# Patient Record
Sex: MALE | Race: WHITE | NOT HISPANIC OR LATINO | Employment: UNEMPLOYED | URBAN - METROPOLITAN AREA
[De-identification: names, ages, dates, MRNs, and addresses within clinical notes are randomized per-mention and may not be internally consistent; named-entity substitution may affect disease eponyms.]

---

## 2021-05-30 ENCOUNTER — RECORDS - HEALTHEAST (OUTPATIENT)
Dept: ADMINISTRATIVE | Facility: CLINIC | Age: 31
End: 2021-05-30

## 2021-06-26 ENCOUNTER — HEALTH MAINTENANCE LETTER (OUTPATIENT)
Age: 31
End: 2021-06-26

## 2021-10-16 ENCOUNTER — HEALTH MAINTENANCE LETTER (OUTPATIENT)
Age: 31
End: 2021-10-16

## 2022-07-23 ENCOUNTER — HEALTH MAINTENANCE LETTER (OUTPATIENT)
Age: 32
End: 2022-07-23

## 2022-10-01 ENCOUNTER — HEALTH MAINTENANCE LETTER (OUTPATIENT)
Age: 32
End: 2022-10-01

## 2023-01-09 ENCOUNTER — HOSPITAL ENCOUNTER (EMERGENCY)
Facility: HOSPITAL | Age: 33
Discharge: HOME | End: 2023-01-09
Admitting: PHYSICIAN ASSISTANT
Payer: COMMERCIAL

## 2023-01-09 VITALS
OXYGEN SATURATION: 97 % | DIASTOLIC BLOOD PRESSURE: 84 MMHG | RESPIRATION RATE: 16 BRPM | BODY MASS INDEX: 26.66 KG/M2 | TEMPERATURE: 98.9 F | HEART RATE: 78 BPM | SYSTOLIC BLOOD PRESSURE: 144 MMHG | HEIGHT: 69 IN | WEIGHT: 180 LBS

## 2023-01-09 DIAGNOSIS — K04.7 DENTAL ABSCESS: ICD-10-CM

## 2023-01-09 DIAGNOSIS — K08.89 PAIN, DENTAL: ICD-10-CM

## 2023-01-09 PROCEDURE — 250N000011 HC RX IP 250 OP 636: Performed by: EMERGENCY MEDICINE

## 2023-01-09 PROCEDURE — 99284 EMERGENCY DEPT VISIT MOD MDM: CPT

## 2023-01-09 PROCEDURE — 96372 THER/PROPH/DIAG INJ SC/IM: CPT | Mod: 59 | Performed by: EMERGENCY MEDICINE

## 2023-01-09 PROCEDURE — 250N000013 HC RX MED GY IP 250 OP 250 PS 637: Performed by: EMERGENCY MEDICINE

## 2023-01-09 RX ORDER — KETOROLAC TROMETHAMINE 30 MG/ML
30 INJECTION, SOLUTION INTRAMUSCULAR; INTRAVENOUS ONCE
Status: COMPLETED | OUTPATIENT
Start: 2023-01-09 | End: 2023-01-09

## 2023-01-09 RX ORDER — OXYCODONE HYDROCHLORIDE 5 MG/1
5 TABLET ORAL ONCE
Status: COMPLETED | OUTPATIENT
Start: 2023-01-09 | End: 2023-01-09

## 2023-01-09 RX ADMIN — KETOROLAC TROMETHAMINE 30 MG: 30 INJECTION, SOLUTION INTRAMUSCULAR; INTRAVENOUS at 15:31

## 2023-01-09 ASSESSMENT — ENCOUNTER SYMPTOMS
HEMATURIA: 0
DIARRHEA: 0
WEAKNESS: 0
HEADACHES: 0
CHILLS: 0
VOMITING: 0
LIGHT-HEADEDNESS: 0
DIZZINESS: 0
NAUSEA: 0
FATIGUE: 0
TROUBLE SWALLOWING: 0
SHORTNESS OF BREATH: 0
SORE THROAT: 0
RECTAL PAIN: 0
FEVER: 0
ABDOMINAL PAIN: 0
CHEST TIGHTNESS: 0
VOICE CHANGE: 0
DYSURIA: 0

## 2023-01-09 ASSESSMENT — ACTIVITIES OF DAILY LIVING (ADL): ADLS_ACUITY_SCORE: 33

## 2023-01-09 NOTE — ED PROVIDER NOTES
EMERGENCY DEPARTMENT ENCOUNTER      NAME: London Sheikh  AGE: 32 year old male  YOB: 1990  MRN: [id number]  EVALUATION DATE & TIME: No admission date for patient encounter.    PCP: No Ref-Primary, Physician    ED PROVIDER: Jon Ohara PA-C      Chief Complaint   Patient presents with     Dental Pain     Facial Swelling         FINAL IMPRESSION:  1. Pain, dental    2. Dental abscess          ED COURSE & MEDICAL DECISION MAKING:    Pertinent Labs & Imaging studies reviewed. (See chart for details)  2:48 PM I met the patient and performed my initial interview and exam. Plan for discharge. Patient stating he does not want to stay for pain medications.     32 year old male presents to the Emergency Department for evaluation of dental pain, left sided.     ED Course as of 01/09/23 1612   Mon Jan 09, 2023   1610 Is a 32-year-old male, presents emergency department for evaluation of left upper sided dental pain, facial swelling.  Patient notes that he had a broken tooth on the left upper side for a couple months.  Has been taking ibuprofen and Tylenol at home.  Last dose of ibuprofen and Tylenol was at noon.  Denies any fevers.  Notes that he previously an appointment with a dentist, however he did not follow-up with them.  No nausea or vomiting.  No dysuria, hematuria, numbness or tingling.  On examination he does have left upper sided dental tenderness, poor dentition throughout his mouth, however area of focal tenderness, fluctuance, with a spontaneously draining dental abscess.  He has no evidence of any deep space abscess, no tenderness throughout the rest of his mouth.  No trismus.  No evidence of any Bryon angina, no concern for any deeper space abscess.  Patient is afebrile, nontachycardic, not acutely toxic appearing here.  Swelling is fairly minimal, and the fact that the abscess is spontaneously draining, no need for any incision and drainage here in the emergency department.  We  will start him on some Augmentin, he will receive a dose of Toradol here in the emergency department, and recommend he follows up with dentistry.  Patient is agreeable with this plan.  Plan for discharge following locations.  No occasion for further labs, imaging at this time.     Medical Decision Making    History:    Supplemental history from: Documented in HPI, if applicable    External Record(s) reviewed: Documented in HPI, if applicable.    Work Up:    Chart documentation includes differential considered and any EKGs or imaging independently interpreted by provider.    In additional to work up documented, I considered the following work up: See chart documentation, if applicable.    External consultation:    Discussion of management with another provider: See chart documentation, if applicable    Complicating factors:    Care impacted by chronic illness: N/A    Care affected by social determinants of health: Access to Medical Care    Disposition considerations: Discharge. I prescribed additional prescription strength medication(s) as charted. N/A.     At the conclusion of the encounter I discussed the results of all of the tests and the disposition. The questions were answered. The patient or family acknowledged understanding and was agreeable with the care plan.     0 minutes of critical care time     MEDICATIONS GIVEN IN THE EMERGENCY:  Medications   ketorolac (TORADOL) injection 30 mg (30 mg Intramuscular Given 1/9/23 1531)   oxyCODONE (ROXICODONE) tablet 5 mg (5 mg Oral Not Given 1/9/23 1530)       NEW PRESCRIPTIONS STARTED AT TODAY'S ER VISIT  New Prescriptions    AMOXICILLIN-CLAVULANATE (AUGMENTIN) 875-125 MG TABLET    Take 1 tablet by mouth 2 times daily for 10 days          =================================================================    [facility]    Patient information was obtained from: Patient     Use of : N/A      London Sheikh is a 32 year old male with a pertinent history of  adjustment disorder, meth use, cannabis use disorder, depression with anxiety, who presents to this ED  for evaluation of left-sided dental pain.  Patient notes is been ongoing for couple of weeks.  Declines any fevers.  Notes that he had an appointment to follow-up with his dentist, however did not.  He has been taking ibuprofen and Tylenol at home.  He is concerned about infection.  Denies any nausea or vomiting, denies any dizziness, lightheadedness, numbness or tingling.  Unclear whether he has had any fevers at home.  Is able to open and close his mouth without difficulty, no difficulty eating or drinking, no other acute complaints other than left-sided dental pain.       REVIEW OF SYSTEMS   Review of Systems   Constitutional: Negative for chills, fatigue and fever.   HENT: Positive for dental problem. Negative for ear pain, sneezing, sore throat, trouble swallowing and voice change.    Respiratory: Negative for chest tightness and shortness of breath.    Cardiovascular: Negative for chest pain.   Gastrointestinal: Negative for abdominal pain, diarrhea, nausea, rectal pain and vomiting.   Genitourinary: Negative for dysuria and hematuria.   Neurological: Negative for dizziness, weakness, light-headedness and headaches.   All other systems reviewed and are negative.       PAST MEDICAL HISTORY:  No past medical history on file.    PAST SURGICAL HISTORY:  Past Surgical History:   Procedure Laterality Date     HERNIA REPAIR Bilateral            CURRENT MEDICATIONS:    amoxicillin-clavulanate (AUGMENTIN) 875-125 MG tablet  albuterol (PROVENTIL HFA: VENTOLIN HFA) 108 (90 BASE) MCG/ACT inhaler  amoxicillin-clavulanate (AUGMENTIN) 875-125 MG per tablet  oseltamivir (TAMIFLU) 75 MG capsule         ALLERGIES:  Allergies   Allergen Reactions     Acetaminophen      Can't take. Hx of intentional OD       FAMILY HISTORY:  No family history on file.    SOCIAL HISTORY:   Social History     Socioeconomic History     Marital  "status: Single   Tobacco Use     Smoking status: Every Day     Packs/day: 1.00     Types: Cigarettes     Smokeless tobacco: Never   Substance and Sexual Activity     Alcohol use: No     Drug use: Yes     Types: IV, Methamphetamines       VITALS:  BP (!) 144/84   Pulse 78   Temp 98.9  F (37.2  C)   Resp 16   Ht 1.753 m (5' 9\")   Wt 81.6 kg (180 lb)   SpO2 97%   BMI 26.58 kg/m      PHYSICAL EXAM    Physical Exam  Vitals and nursing note reviewed.   Constitutional:       General: He is not in acute distress.     Appearance: Normal appearance. He is normal weight. He is not toxic-appearing or diaphoretic.   HENT:      Right Ear: External ear normal.      Left Ear: External ear normal.      Mouth/Throat:      Mouth: Mucous membranes are moist.      Dentition: Abnormal dentition. Dental tenderness, dental caries and dental abscesses present.      Pharynx: No oropharyngeal exudate or posterior oropharyngeal erythema.      Tonsils: No tonsillar exudate or tonsillar abscesses.      Comments: Patient is left upper sided dental tenderness, with a spontaneously draining over sided dental abscess.  No sublingual tenderness, no evidence of Bryon angina, no other acute tenderness throughout the mouth, poor dentition throughout.  No posterior oropharyngeal swelling, exudate, tonsillar abscess.  Uvula is midline.  No swelling. No trismus.  Eyes:      Conjunctiva/sclera: Conjunctivae normal.   Cardiovascular:      Rate and Rhythm: Normal rate and regular rhythm.   Pulmonary:      Effort: Pulmonary effort is normal.   Abdominal:      General: Abdomen is flat. There is no distension.      Palpations: Abdomen is soft.      Tenderness: There is no abdominal tenderness. There is no guarding or rebound.   Skin:     Findings: Erythema present.   Neurological:      Mental Status: He is alert. Mental status is at baseline.         LAB:  All pertinent labs reviewed and interpreted.  Labs Ordered and Resulted from Time of ED Arrival to " Time of ED Departure - No data to display    RADIOLOGY:  Reviewed all pertinent imaging. Please see official radiology report.  No orders to display     PROCEDURES:   None.     Jon Ohara PA-C  Emergency Medicine  [facility] EMERGENCY DEPARTMENT  [address] [place] [id number]  [phone]  Dept: [phone]     Jon Ohara PA-C  01/09/23 6742

## 2023-01-09 NOTE — DISCHARGE INSTRUCTIONS
You were seen here in the emergency department for evaluation of dental pain, your exam here is consistent with- dental abscess- we will put you on antibiotics, please follow up with your dentist.         [place] [facility]   [facility]  [address], [facility]  [phone] Accepts [place]    [facility]  [address] , [facility]  [phone] No Fee Hours and services vary each month - call them before going.  Sometimes only offer extractions.   [facility]  [address] ,[facility]  [phone] Sliding fee: ER visit - $50 up front  regular - $35 up front Call or arrive at 7:45 AM on Mondays, Tues, or Thursdays to sign up for same-day appointments for dentalpain / emergencies.        [facility] Sliding  fee  Call for details Walk-ins and same-day appointmentsin's accepted 8-11AM and 1-4PM  Monday-Friday   [address]     [phone]          [facility] dental Sliding fee If no insurance, call first.    [address], [place]     [phone]          [facility]  [address], [facility]  [phone] Sliding fee Call 8:00 AM Mon-Thurs for next-day  appointments (for emergencies/pain only) Help with [place] is available.        [facility]  [address], [facility]  [phone] Call for fees Only for adult dental emergencies.  Costs about 30% less than private practice clinics  To sign up for the regular dental clinic, call [phone].        [facility])  [address]  [phone] Sliding fee scale For people without dentalinsurance only.   Cleaning, xray, exams, fillings, sealants only - no extractions or rootcanals, etc.  No walk-ins.        [address],[facility]  [phone] No Fee No walk-ins, not accepting people with insurance. Extractions for uninsured people only. Call Friday 2PM to get on  next week's list        [facility]   [address]  [phone] Sliding fee  $40 up front No walk-ins. Call at 8AM Mon-Fri forsame- day visits.  Can schedule Saturday emergency visits by calling Friday morning.   [facility]  [address]  [phone] Sliding fee  Call for details No walk-ins.  For emergency appointments:  Call on Thursday 3PM (for Friday appt) OR Call on Friday 3PM (for Monday appt)        [facility]  [address] [place] [address]  [phone] Sliding fee -   Call for details Call on Tuesdays at 3PM to get anurgent Weds. appointment.  Call anytime during business hours to schedule other appointments.             OTHER RESOURCES       [facility],   [address]  [address] [place]  [address]  [phone]    [facility]  [address] [place] [address]  [phone]      Referral Service, 800-DENTIST        Open 9a to 9p 7 days a week          7 days a week 7a to 5p.                [employer] [facility], [phone] For people who are elderly,disabled, or medically compromised and have no other way to pay for dental care. Call to get an application. If approved, services are provided through volunteer dentists.

## 2023-01-09 NOTE — ED TRIAGE NOTES
Pt comes in with pain and swelling to L side of face. Pt has had broken upper tooth on L side for a couple months. Been taking tylenol and ibuprofen at home. Last dose tylenol and ibuprofen at noon. Ice and heat at home attempted.

## 2023-08-06 ENCOUNTER — HEALTH MAINTENANCE LETTER (OUTPATIENT)
Age: 33
End: 2023-08-06

## 2024-05-27 ENCOUNTER — HOSPITAL ENCOUNTER (EMERGENCY)
Facility: HOSPITAL | Age: 34
Discharge: HOME | End: 2024-05-27
Attending: EMERGENCY MEDICINE | Admitting: EMERGENCY MEDICINE
Payer: COMMERCIAL

## 2024-05-27 VITALS
HEIGHT: 69 IN | SYSTOLIC BLOOD PRESSURE: 145 MMHG | HEART RATE: 76 BPM | RESPIRATION RATE: 18 BRPM | WEIGHT: 170 LBS | BODY MASS INDEX: 25.18 KG/M2 | TEMPERATURE: 98 F | OXYGEN SATURATION: 98 % | DIASTOLIC BLOOD PRESSURE: 83 MMHG

## 2024-05-27 DIAGNOSIS — S61.512A LACERATION OF LEFT WRIST, INITIAL ENCOUNTER: ICD-10-CM

## 2024-05-27 PROCEDURE — 99283 EMERGENCY DEPT VISIT LOW MDM: CPT

## 2024-05-27 PROCEDURE — 250N000009 HC RX 250: Performed by: EMERGENCY MEDICINE

## 2024-05-27 PROCEDURE — 12002 RPR S/N/AX/GEN/TRNK2.6-7.5CM: CPT

## 2024-05-27 RX ORDER — GINSENG 100 MG
CAPSULE ORAL ONCE
Status: COMPLETED | OUTPATIENT
Start: 2024-05-28 | End: 2024-05-27

## 2024-05-27 RX ADMIN — BACITRACIN: 500 OINTMENT TOPICAL at 23:49

## 2024-05-27 ASSESSMENT — COLUMBIA-SUICIDE SEVERITY RATING SCALE - C-SSRS
1. IN THE PAST MONTH, HAVE YOU WISHED YOU WERE DEAD OR WISHED YOU COULD GO TO SLEEP AND NOT WAKE UP?: NO
2. HAVE YOU ACTUALLY HAD ANY THOUGHTS OF KILLING YOURSELF IN THE PAST MONTH?: NO
6. HAVE YOU EVER DONE ANYTHING, STARTED TO DO ANYTHING, OR PREPARED TO DO ANYTHING TO END YOUR LIFE?: NO

## 2024-05-28 NOTE — ED TRIAGE NOTES
Pt was chopping wood just before coming in- the hatchet fell out of hand because it was sweaty and hit left wrist. Just was sharpened and caused laceration. Bleeding controlled. CMS intact. Last tdap 2020    Pt denies any self injury behaviors.

## 2024-05-28 NOTE — ED PROVIDER NOTES
EMERGENCY DEPARTMENT ENCOUNTER      NAME: London Sheikh  AGE: 33 year old male  YOB: 1990  MRN: [id number]  EVALUATION DATE & TIME: 5/27/2024 11:12 PM    PCP: No Ref-Primary, Physician    ED PROVIDER: Andrea Arriaga M.D.      Chief Complaint   Patient presents with    Laceration         IMPRESSION  1. Laceration of left wrist, initial encounter        PLAN  - routine non-absorbable sutured wound cares  - suture removal in 7 days in PCP clinic (5 total sutures)  - discharge to home    ED COURSE & MEDICAL DECISION MAKING    ED Course as of 05/28/24 0045   Mon May 27, 2024   2346 33yoM right handed with tetanus up to date presenting with a friend from home for evaluation of left wrist laceration. Was using a hatchet to cut wood when it slipped out of his right hand and cut his left wrist. Has 3cm laceration to flexor left wrist, radial aspect but just lateral to palpable radial artery with no active bleeding, no hematoma, no tenderness or pain with ROM of joints, distal CMS intact. Doubt neurovascular injury, tendon injury, bony injury; patient comfortable not obtaining imaging here now which I agree with. Laceration cleansed & closed by me at bedside with 5 4-0 Ethilon sutures; patient tolerated this well. Dressed with bacitracin & clean bandage. Patient comfortable with discharge at this time. Return precautions and need for PCP follow up discussed and understood. No further questions at the time of discharge.       --------------------------------------------------------------------------------   --------------------------------------------------------------------------------     11:24 PM I met with the patient for the initial interview and physical examination. Discussed plan for treatment and workup in the ED.  11:35 PM Performed laceration repair. We discussed the plan for discharge and the patient is agreeable. Reviewed supportive cares, symptomatic treatment, outpatient follow up, and  reasons to return to the Emergency Department. Patient to be discharged by ED RN.     This patient involved a high degree of complexity in medical decision making, as significant risks were present and assessed. Recent encounters & results in medical record reviewed by me.    All workup (i.e. any EKG/labs/imaging as per charting below) reviewed and independently interpreted by me. See respective sections for details.    Broad differential considered for this patient, including but not limited to:  superficial laceration, neurovascular injury, tendon injury, bony injury, foreign body      See additional MDM below if interested.      MEDICATIONS GIVEN IN THE EMERGENCY DEPARTMENT  Medications   bacitracin ointment ( Topical $Given 5/27/24 3435)       NEW PRESCRIPTIONS STARTED AT TODAY'S ER VISIT  Discharge Medication List as of 5/27/2024 11:49 PM        CONTINUE these medications which have NOT CHANGED    Details   albuterol (PROVENTIL HFA: VENTOLIN HFA) 108 (90 BASE) MCG/ACT inhaler Inhale 2 puffs into the lungs every 4 hours as needed for shortness of breath / dyspnea., 2 puff, Inhalation, EVERY 4 HOURS PRN Starting 12/21/2012, Until Discontinued, Disp-1 Inhaler, R-0, E-PrescribeUse with an inhaler.      amoxicillin-clavulanate (AUGMENTIN) 875-125 MG per tablet Take 1 tablet by mouth 2 times daily., 1 tablet, Oral, 2 TIMES DAILY, Until Discontinued, Historical      oseltamivir (TAMIFLU) 75 MG capsule Take 1 capsule by mouth 2 times daily., 75 mg, Oral, 2 TIMES DAILY Starting 12/21/2012, Until Discontinued, Disp-10 capsule, R-0, E-Prescribe                 =================================================================      HPI  Use of : N/A         London MARCELO Sheikh is a 33 year old male with a pertinent history of hypertension and hyperlipidemia who presents to this ED by walk in for evaluation of laceration.    Prior to arrival, the patient reports he was using an axe to chop wood. He put down the axe  and picked up the hatchet with his right hand, noting his hands were sweaty and he was not wearing gloves. Accidentally dropped the hatchet onto his left wrist, which was holding a piece of wood. Sustained a large laceration to left wrist.No other complaints at this time.       --------------- MEDICAL HISTORY ---------------  PAST MEDICAL HISTORY:  Reviewed independently by me.  History reviewed. No pertinent past medical history.  Patient Active Problem List   Diagnosis    CARDIOVASCULAR SCREENING; LDL GOAL LESS THAN 160       PAST SURGICAL HISTORY:  Reviewed independently by me.  Past Surgical History:   Procedure Laterality Date    HERNIA REPAIR Bilateral        CURRENT MEDICATIONS:    Reviewed independently by me.  No current facility-administered medications for this encounter.    Current Outpatient Medications:     albuterol (PROVENTIL HFA: VENTOLIN HFA) 108 (90 BASE) MCG/ACT inhaler, Inhale 2 puffs into the lungs every 4 hours as needed for shortness of breath / dyspnea., Disp: 1 Inhaler, Rfl: 0    amoxicillin-clavulanate (AUGMENTIN) 875-125 MG per tablet, Take 1 tablet by mouth 2 times daily., Disp: , Rfl:     oseltamivir (TAMIFLU) 75 MG capsule, Take 1 capsule by mouth 2 times daily., Disp: 10 capsule, Rfl: 0    ALLERGIES:  Reviewed independently by me.  Allergies   Allergen Reactions    Acetaminophen      Can't take. Hx of intentional OD       FAMILY HISTORY:  Reviewed independently by me.  History reviewed. No pertinent family history.    SOCIAL HISTORY:   Reviewed independently by me.  Social History     Socioeconomic History    Marital status: Single   Tobacco Use    Smoking status: Every Day     Current packs/day: 1.00     Types: Cigarettes    Smokeless tobacco: Never   Substance and Sexual Activity    Alcohol use: No    Drug use: Yes     Types: IV, Methamphetamines     Social Determinants of Health     Financial Resource Strain: Low Risk  (5/1/2024)    Financial Resource Strain     Within the past 12  "months, have you or your family members you live with been unable to get utilities (heat, electricity) when it was really needed?: No   Food Insecurity: High Risk (5/1/2024)    Food Insecurity     Within the past 12 months, did you worry that your food would run out before you got money to buy more?: Yes     Within the past 12 months, did the food you bought just not last and you didn t have money to get more?: Yes   Transportation Needs: High Risk (5/1/2024)    Transportation Needs     Within the past 12 months, has lack of transportation kept you from medical appointments, getting your medicines, non-medical meetings or appointments, work, or from getting things that you need?: Yes    Received from [facility]    Social Connections   Housing Stability: High Risk (5/1/2024)    Housing Stability     Do you have housing? : Patient declined     Are you worried about losing your housing?: Yes       --------------- PHYSICAL EXAM ---------------  Nursing notes and vitals independently reviewed by me.  VITALS:  Vitals:    05/27/24 2309 05/27/24 2351   BP: (!) 139/102 (!) 145/83   Pulse: 96 76   Resp: 18    Temp: 98  F (36.7  C)    TempSrc: Temporal    SpO2: 97% 98%   Weight: 77.1 kg (170 lb)    Height: 1.753 m (5' 9\")        PHYSICAL EXAM:    General:  alert, interactive, no distress  Eyes:  conjunctivae clear, conjugate gaze  HENT:  atraumatic, nose with no rhinorrhea, oropharynx clear  Neck:  no meningismus  Cardiovascular:  HR 80s during exam, regular rhythm, no murmurs, brisk cap refill  Chest:  no chest wall tenderness  Pulmonary:  no stridor, normal phonation, normal work of breathing, clear lungs bilaterally  Abdomen:  soft, nondistended, nontender  :  no CVA tenderness  Back:  no midline spinal tenderness  Musculoskeletal:  flexor of left wrist with 3 cm laceration over radial aspect just lateral to palpable radial artery. no hematoma, active bleeding, or tenderness. No pain with " ROM of wrist and fingers. no pretibial edema, no calf tenderness. Gross ROM intact to joints of extremities with no obvious deformities.  Skin:  warm, dry, no rash  Neuro:  awake, alert, answers questions appropriately, follows commands, moves all limbs  Psych:  calm, normal affect      --------------- RESULTS ---------------  PROCEDURES:   [facility]    -Laceration Repair    Date/Time: 5/27/2024 11:34 PM    Performed by: Andrea Arriaga MD  Authorized by: Andrea Arriaga MD    Risks, benefits and alternatives discussed.      UNIVERSAL PROTOCOL   Site Marked: NA  Prior Images Obtained and Reviewed:  NA  Required items: Required blood products, implants, devices and special equipment available    Patient identity confirmed:  Verbally with patient  NA - No sedation, light sedation, or local anesthesia  Confirmation Checklist:  Patient's identity using two indicators and relevant allergies  Universal Protocol: the Joint Commission Universal Protocol was followed    Preparation: Patient was prepped and draped in usual sterile fashion      ANESTHESIA (see MAR for exact dosages):     Anesthesia method:  Local infiltration    Local anesthetic:  Lidocaine 1% WITH epi  LACERATION DETAILS     Location: left flexor wrist, radial aspect.    Length (cm):  3    Depth (mm):  2    REPAIR TYPE:     Repair type:  Simple    EXPLORATION:     Wound exploration: wound explored through full range of motion and entire depth of wound probed and visualized      Wound extent: areolar tissue violated      Wound extent: fascia not violated, no foreign body, no signs of injury, no nerve damage, no tendon damage, no underlying fracture and no vascular damage      Contaminated: no      TREATMENT:     Area cleansed with:  Sherry    Amount of cleaning:  Standard    Irrigation solution:  Sterile saline    Irrigation method:  Syringe    Visualized foreign bodies/material removed: no      SKIN REPAIR     Repair  method:  Sutures    Suture size:  4-0    Suture material:  Nylon    Suture technique:  Simple interrupted    Number of sutures:  5    APPROXIMATION     Approximation:  Close    POST-PROCEDURE DETAILS     Dressing:  Antibiotic ointment and non-adherent dressing      PROCEDURE    Patient Tolerance:  Patient tolerated the procedure well with no immediate complications           --------------- ADDITIONAL MDM ---------------  History:  - I considered systemic symptoms of the presenting illness.  - Supplemental history from:       -- patient, friend  - External Record(s) reviewed:       -- Inpatient/outpatient record (vaccine record, outside ED visit 8/17/23), prior labs (blood 7/27/23), prior imaging (CXR 2/18/21)       -- see above ED course & MDM for further details    Workup:  - Chart documentation above includes differential considered and any EKGs or imaging independently interpreted by provider.  - emergent/severe conditions considered and evaluated for: see above differential & MDM  - In additional to work up documented, I considered the following work up:       -- CTA LUE       -- see above ED course & MDM for further details    External Consultation:  - Discussion of management with another provider:       -- ED pharmacist re: meds       -- see above charting for additional    Complicating Factors:  - Care impacted by chronic illness:       -- see above MDM, past medical history, & problem list  - Care affected by social determinants of health:       -- see above social history       -- Access to Affordable Healthcare    Disposition Considerations:  - Discharge       -- I considered escalation of care with admission to the hospital, but ultimately discharged the patient per decision making above       -- I recommended the patient continue their current prescription strength medication(s) as charted above in current medications list       -- I considered prescription pain medication, comfortable without this        -- I recommended over-the-counter medication(s) as charted above & in discharge instructions           I, Lilian Minaya, am serving as a scribe to document services personally performed by Dr. Andrea Arriaga based on my observation and the provider's statements to me. I, Andrea Arriaga MD attest that Lilian Minaya is acting in a scribe capacity, has observed my performance of the services and has documented them in accordance with my direction.      Andrea Arriaga MD  05/27/24  Emergency Medicine  [facility] EMERGENCY DEPARTMENT  [address] [place] [id number]  [phone]  Dept: [phone]     Andrea Arriaga MD  05/28/24 0048

## 2024-09-29 ENCOUNTER — HEALTH MAINTENANCE LETTER (OUTPATIENT)
Age: 34
End: 2024-09-29